# Patient Record
Sex: MALE | Employment: FULL TIME | URBAN - METROPOLITAN AREA
[De-identification: names, ages, dates, MRNs, and addresses within clinical notes are randomized per-mention and may not be internally consistent; named-entity substitution may affect disease eponyms.]

---

## 2019-11-03 ENCOUNTER — HOSPITAL ENCOUNTER (EMERGENCY)
Facility: CLINIC | Age: 29
Discharge: HOME | End: 2019-11-03
Attending: EMERGENCY MEDICINE | Admitting: EMERGENCY MEDICINE
Payer: COMMERCIAL

## 2019-11-03 ENCOUNTER — APPOINTMENT (OUTPATIENT)
Dept: GENERAL RADIOLOGY | Facility: CLINIC | Age: 29
End: 2019-11-03
Attending: EMERGENCY MEDICINE
Payer: COMMERCIAL

## 2019-11-03 VITALS
SYSTOLIC BLOOD PRESSURE: 148 MMHG | WEIGHT: 160 LBS | TEMPERATURE: 98.2 F | BODY MASS INDEX: 22.4 KG/M2 | HEIGHT: 71 IN | OXYGEN SATURATION: 99 % | RESPIRATION RATE: 18 BRPM | HEART RATE: 106 BPM | DIASTOLIC BLOOD PRESSURE: 94 MMHG

## 2019-11-03 DIAGNOSIS — J06.9 UPPER RESPIRATORY TRACT INFECTION, UNSPECIFIED TYPE: ICD-10-CM

## 2019-11-03 LAB
D DIMER PPP FEU-MCNC: <0.3 UG/ML FEU (ref 0–0.5)
DEPRECATED S PYO AG THROAT QL EIA: NORMAL
FLUAV+FLUBV AG SPEC QL: NEGATIVE
FLUAV+FLUBV AG SPEC QL: NEGATIVE
SPECIMEN SOURCE: NORMAL
SPECIMEN SOURCE: NORMAL

## 2019-11-03 PROCEDURE — 71046 X-RAY EXAM CHEST 2 VIEWS: CPT

## 2019-11-03 PROCEDURE — 87880 STREP A ASSAY W/OPTIC: CPT | Performed by: EMERGENCY MEDICINE

## 2019-11-03 PROCEDURE — 36415 COLL VENOUS BLD VENIPUNCTURE: CPT | Performed by: EMERGENCY MEDICINE

## 2019-11-03 PROCEDURE — 87804 INFLUENZA ASSAY W/OPTIC: CPT | Performed by: EMERGENCY MEDICINE

## 2019-11-03 PROCEDURE — 99284 EMERGENCY DEPT VISIT MOD MDM: CPT | Mod: 25

## 2019-11-03 PROCEDURE — 85379 FIBRIN DEGRADATION QUANT: CPT | Performed by: EMERGENCY MEDICINE

## 2019-11-03 PROCEDURE — 87081 CULTURE SCREEN ONLY: CPT | Performed by: EMERGENCY MEDICINE

## 2019-11-03 RX ORDER — BENZONATATE 200 MG/1
200 CAPSULE ORAL 3 TIMES DAILY PRN
Qty: 20 CAPSULE | Refills: 0 | Status: SHIPPED | OUTPATIENT
Start: 2019-11-03

## 2019-11-03 ASSESSMENT — ENCOUNTER SYMPTOMS
COUGH: 1
SORE THROAT: 1
RHINORRHEA: 0

## 2019-11-03 ASSESSMENT — MIFFLIN-ST. JEOR: SCORE: 1717.89

## 2019-11-03 NOTE — ED PROVIDER NOTES
"  History     Chief Complaint:  Flu Symptoms     HPI   Franky Fox is an otherwise healthy 29 year old male who presents with flu symptoms. The patient reports 5 days of pruritic throat, cough with increased sputum, and a chest ache. He has tried treating with steam inhalation and applying Vicks, but without relief, prompting his presentation. He has not taken any ibuprofen. He does not have a runny nose. He notes one ill contact by his brother was recently diagnosed with a sinus infection. There is no reported swelling in his lower extremites, but he reports some mild pain on the lateral aspect of his posterior left knee. He just drove 5.5 hours from [place] and took only one break. He has no reported history of blood clots in the legs or lungs.     Allergies:  No Known Drug Allergies    Medications:    Medications reviewed. No current medications.     Past Medical History:    Medical history reviewed. No pertinent medical history.    Past Surgical History:    Surgical history reviewed. No pertinent surgical history.    Family History:    Family history reviewed. No pertinent family history.     Social History:  Smoking status: Former smoker  Alcohol use: No  Drug use: No  Presents to the ED with himself     Review of Systems   HENT: Positive for sore throat. Negative for rhinorrhea.    Respiratory: Positive for cough.    Cardiovascular: Positive for chest pain (ache).   10 point review of systems performed and is negative except as above and in HPI.    Physical Exam     Patient Vitals for the past 24 hrs:   BP Temp Temp src Pulse Resp SpO2 Height Weight   11/03/19 0842 (!) 148/94 98.2  F (36.8  C) Temporal 106 18 99 % 1.803 m (5' 11\") 75.8 kg (167 lb)       Physical Exam  General: Resting on the gurney, appears comfortable  Head:  The scalp, face, and head appear normal  Mouth/Throat: Mucus membranes are moist. Slight redness of the posterior oropharynx.  CV:  Regular rate    Normal S1 and S2  No " pathological murmur   Resp:  Breath sounds clear and equal bilaterally, good ascultation in all fields.    Non-labored, no retractions or accessory muscle use    No coarseness    No wheezing   GI:  Abdomen is soft, no rigidity    No tenderness to palpation  MS:  No lower extremity swelling, redness, or excess warmth.    Normal motor assessment of all extremities.    Good capillary refill noted.    Skin:  No rash or lesions noted.  Neuro:   Speech is normal and fluent. No apparent deficit.  Psych: Awake. Alert.  Normal affect.      Appropriate interactions.    Emergency Department Course   Imaging:  Radiographic findings were communicated with the patient who voiced understanding of the findings.    Chest XR, PA & LAT  Heart and pulmonary vessels within normal limits. Lungs clear. No  pleural effusion.  As read by Radiology.    Laboratory:  Influenza A/b antigen: Negative  Rapid strep screen: Negative  Beta strep group A culture: In process    D dimer quantitative: <0.3    Emergency Department Course:  Past medical records, nursing notes, and vitals reviewed.  0856: I performed an exam of the patient and obtained history, as documented above.    The patient was sent for a chest x-ray while in the emergency department, findings above.    1056: Findings and plan explained to the patient. Patient discharged home with instructions regarding supportive care, medications, and reasons to return. The importance of close follow-up was reviewed. The patient was prescribed Benzonatate.     Impression & Plan    Medical Decision Making:  Cherelle Fox is a 29 year old male who presents with cough and sore throat. There is no clinical evidence of dehydration. There is no evidence of any respiratory distress. The patient has normal oxygen saturations with normal work of breathing.  A chest x-ray was obtained which was normal. He has no fevers, no rales on exam, and no hypoxia, no wheezing. The patient has no significant  underlying comorbid cardiopulmonary process including and is not immunocompromised. And at this time I find no indication for antibiotics.   I discussed symptomatic treatment including anti-inflammatories and he was discharged home with a prescription for Benzonatate.  Given recent travel, ddimer obtained and negative.  Given his constellation of symptoms, including sore throat and recent ill contact, uri much more likely than pe. Primary clinic follow up in 1 week is recommended for persistent symptoms.  There are no high risk features at this time to suggest any benefit from antibiotics including no history of any significant comorbid cardiac, hepatic, renal, neuromuscular or immunosuppressive conditions.    Diagnosis:    ICD-10-CM   1. Upper respiratory tract infection, unspecified type J06.9       Disposition: Discharged to home    Discharge Medications:  New Prescriptions    BENZONATATE (TESSALON) 200 MG CAPSULE    Take 1 capsule (200 mg) by mouth 3 times daily as needed for cough     I, Kiesha Chi, am serving as a scribe at 8:56 AM on 11/3/2019 to document services personally performed by Gail Castillo MD based on my observations and the provider's statements to me.     Kiesha Chi  11/3/2019    EMERGENCY DEPARTMENT       Gail Castillo MD  11/03/19 4002

## 2019-11-03 NOTE — DISCHARGE INSTRUCTIONS

## 2019-11-05 LAB
BACTERIA SPEC CULT: NORMAL
Lab: NORMAL
SPECIMEN SOURCE: NORMAL

## 2019-11-05 NOTE — RESULT ENCOUNTER NOTE
Final Beta strep group A r/o culture is NEGATIVE for Group A streptococcus.    No treatment or change in treatment per [facility] Strep protocol.